# Patient Record
Sex: MALE | Race: WHITE | NOT HISPANIC OR LATINO
[De-identification: names, ages, dates, MRNs, and addresses within clinical notes are randomized per-mention and may not be internally consistent; named-entity substitution may affect disease eponyms.]

---

## 2020-01-15 ENCOUNTER — RECORD ABSTRACTING (OUTPATIENT)
Age: 70
End: 2020-01-15

## 2020-01-15 DIAGNOSIS — Z78.9 OTHER SPECIFIED HEALTH STATUS: ICD-10-CM

## 2020-01-15 DIAGNOSIS — I10 ESSENTIAL (PRIMARY) HYPERTENSION: ICD-10-CM

## 2020-01-15 LAB
PSA FREE FLD-MCNC: 20
PSA FREE SERPL-MCNC: 2.2
PSA SERPL-MCNC: 11
TESTOST BND SERPL-MCNC: 215.2

## 2020-01-29 ENCOUNTER — APPOINTMENT (OUTPATIENT)
Dept: UROLOGY | Facility: CLINIC | Age: 70
End: 2020-01-29
Payer: MEDICARE

## 2020-01-29 VITALS
WEIGHT: 218 LBS | HEART RATE: 71 BPM | HEIGHT: 77 IN | TEMPERATURE: 98.4 F | BODY MASS INDEX: 25.74 KG/M2 | DIASTOLIC BLOOD PRESSURE: 92 MMHG | SYSTOLIC BLOOD PRESSURE: 157 MMHG

## 2020-01-29 DIAGNOSIS — Z87.39 PERSONAL HISTORY OF OTHER DISEASES OF THE MUSCULOSKELETAL SYSTEM AND CONNECTIVE TISSUE: ICD-10-CM

## 2020-01-29 DIAGNOSIS — Z80.41 FAMILY HISTORY OF MALIGNANT NEOPLASM OF OVARY: ICD-10-CM

## 2020-01-29 DIAGNOSIS — Z80.51 FAMILY HISTORY OF MALIGNANT NEOPLASM OF KIDNEY: ICD-10-CM

## 2020-01-29 DIAGNOSIS — M72.2 PLANTAR FASCIAL FIBROMATOSIS: ICD-10-CM

## 2020-01-29 DIAGNOSIS — K21.9 GASTRO-ESOPHAGEAL REFLUX DISEASE W/OUT ESOPHAGITIS: ICD-10-CM

## 2020-01-29 PROCEDURE — 76857 US EXAM PELVIC LIMITED: CPT

## 2020-01-29 PROCEDURE — 99205 OFFICE O/P NEW HI 60 MIN: CPT

## 2020-01-29 RX ORDER — OLMESARTAN MEDOXOMIL 20 MG/1
20 TABLET, FILM COATED ORAL
Qty: 90 | Refills: 0 | Status: ACTIVE | COMMUNITY
Start: 2019-12-10

## 2020-01-29 NOTE — REVIEW OF SYSTEMS
[see HPI] : see HPI [Eyesight Problems] : eyesight problems [Negative] : Eyes [Heartburn] : heartburn

## 2020-01-29 NOTE — ASSESSMENT
[FreeTextEntry1] : I discussed the findings and options with . SYED HALL in detail.  His PSAs have been elevated but stable throughout the years.  In view of this, his reported normal prostate biopsy and MRI in the past, we will forego any additional intervention at this time. I have asked him to try and find the biopsy report and PSA corresponding to that period and send them to us.\par \par I would like Mr. Hall to get a baseline renal sono because of his prior history of urolithiasis and current TUMS intake. \par \par Providing the current PSA is stable, I look forward to seeing Mr. Hall in one year (sono, PSA).\par

## 2020-01-29 NOTE — PHYSICAL EXAM
[Normal Appearance] : normal appearance [General Appearance - Well Developed] : well developed [General Appearance - Well Nourished] : well nourished [General Appearance - In No Acute Distress] : no acute distress [Well Groomed] : well groomed [Edema] : no peripheral edema [Heart Rate And Rhythm] : Heart rate and rhythm were normal [Exaggerated Use Of Accessory Muscles For Inspiration] : no accessory muscle use [Respiration, Rhythm And Depth] : normal respiratory rhythm and effort [Abdomen Tenderness] : non-tender [Costovertebral Angle Tenderness] : no ~M costovertebral angle tenderness [Abdomen Soft] : soft [Penis Abnormality] : normal circumcised penis [Urinary Bladder Findings] : the bladder was normal on palpation [Urethral Meatus] : meatus normal [Scrotum] : the scrotum was normal [Testes Tenderness] : no tenderness of the testes [Testes Mass (___cm)] : there were no testicular masses [No Prostate Nodules] : no prostate nodules [Rectal Exam - Rectum] : digital rectal exam was normal [Prostate Tenderness] : the prostate was not tender [Normal Station and Gait] : the gait and station were normal for the patient's age [] : no rash [Oriented To Time, Place, And Person] : oriented to person, place, and time [No Focal Deficits] : no focal deficits [Affect] : the affect was normal [Not Anxious] : not anxious [Mood] : the mood was normal [No Palpable Adenopathy] : no palpable adenopathy [Abdomen Mass (___ Cm)] : no abdominal mass palpated [FreeTextEntry1] : MIldly indurated left caput epididymis.  Scrotal angiokeratomas

## 2020-01-29 NOTE — LETTER BODY
[Consult Letter:] : I had the pleasure of evaluating your patient, [unfilled]. [Dear  ___] : Dear  [unfilled], [Please see my note below.] : Please see my note below. [Consult Closing:] : Thank you very much for allowing me to participate in the care of this patient.  If you have any questions, please do not hesitate to contact me. [Sincerely,] : Sincerely, [FreeTextEntry3] : Jermaine Escalona MD, FACS\par

## 2020-01-29 NOTE — HISTORY OF PRESENT ILLNESS
[FreeTextEntry1] : Mr. SYED HALL comes in today for a urologic evaluation.  He presents with a long history of minimal to moderate LUTS (predominantly irritative) and nocturia x2.\par IPSS: 9/35\par Sono:  42cc PVR; 154cc prostate\par \par Mr. Hall has longstanding elevated PSAs (see below).  Approximately 10yrs ago he had a prostate biopsy and was admitted for a post-bx UTI/sepsis. The pathology report was reportedly negative. \par \par PSAs:  5/7/19--9.6; 1/10/19--11.0 (20%); 6/28/17--13/2 (23%); 12/15/16--(25%); 6/17/14--11.4 (19%); 3/13/13--7.2; 1/18/10--8.1 (17%)\par MRI prostate:  4/21/16--Severe BPH\par \par Mr. Hall also has a history of urolithiasis since his 20's, with three total episodes of colic--most recently approximately 13yrs ago.  He did not require any intervention for these. He has not had any recent followup for this.\par \par His erectile function is normal although he is not sexually active.\par

## 2020-01-30 LAB
PSA FREE FLD-MCNC: 23 %
PSA FREE SERPL-MCNC: 2.64 NG/ML
PSA SERPL-MCNC: 11.6 NG/ML

## 2021-02-02 NOTE — REVIEW OF SYSTEMS
[Eyesight Problems] : eyesight problems [Heartburn] : heartburn [see HPI] : see HPI [Negative] : Eyes

## 2021-02-04 ENCOUNTER — APPOINTMENT (OUTPATIENT)
Dept: UROLOGY | Facility: CLINIC | Age: 71
End: 2021-02-04
Payer: MEDICARE

## 2021-02-04 DIAGNOSIS — Z00.00 ENCOUNTER FOR GENERAL ADULT MEDICAL EXAMINATION W/OUT ABNORMAL FINDINGS: ICD-10-CM

## 2021-02-04 LAB
BILIRUB UR QL STRIP: NORMAL
CLARITY UR: CLEAR
COLLECTION METHOD: NORMAL
GLUCOSE UR-MCNC: NORMAL
HCG UR QL: 0.2 EU/DL
HGB UR QL STRIP.AUTO: NORMAL
KETONES UR-MCNC: NORMAL
LEUKOCYTE ESTERASE UR QL STRIP: NORMAL
NITRITE UR QL STRIP: NORMAL
PH UR STRIP: 5.5
PROT UR STRIP-MCNC: NORMAL
SP GR UR STRIP: NORMAL

## 2021-02-04 PROCEDURE — 76775 US EXAM ABDO BACK WALL LIM: CPT

## 2021-02-04 PROCEDURE — 99214 OFFICE O/P EST MOD 30 MIN: CPT | Mod: 25

## 2021-02-04 PROCEDURE — 76857 US EXAM PELVIC LIMITED: CPT | Mod: 59

## 2021-02-04 PROCEDURE — 81003 URINALYSIS AUTO W/O SCOPE: CPT | Mod: QW

## 2021-02-04 NOTE — ADDENDUM
[FreeTextEntry1] : A portion of this note was written by [Lenny Galvez] on 02/02/2021 acting as a scribe for Dr. Escalona. \par \par I have personally reviewed the chart and agree that the record accurately reflects my personal performance of the history, physical exam, assessment, and plan.

## 2021-02-04 NOTE — HISTORY OF PRESENT ILLNESS
[FreeTextEntry1] : Mr. SYED HALL comes in today for followup.  He presents with a longstanding history of  moderate lower urinary tract symptoms (predominantly irritative) and nocturia x 2. On occasion he has rare urge incontinence.  He drinks 4-5 cups of coffee daily.\par IPSS: 5/35\par Sono:  20cc PVR; 140cc prostate\par \par In addition, Mr. Hall has longstanding elevated PSAs (see below).  Approximately 11yrs ago he had a prostate biopsy and was admitted for a post-bx UTI/sepsis. The pathology report was reportedly negative (not provided). \par \par Mr. Hall also has a history of urolithiasis since his 20's, with three total episodes of colic--most recently approximately 14yrs ago.  He did not require any intervention for these. \par \par His erectile function is normal although he is not sexually active.\par \par PSAs:  1/29/20--11.6 (23%); 5/7/19--9.6; 1/10/19--11.0 (20%); 6/28/17--13.2 (23%); 12/15/16--11.1 (25%); 6/17/14--11.4 (19%); 3/13/13--7.2; 1/18/10--8.1 (17%)\par \par MRI prostate:  4/21/16--Severe BPH\par \par Renal sono: Bilateral renal cysts.  No stones.\par

## 2021-02-04 NOTE — LETTER BODY
[Dear  ___] : Dear  [unfilled], [Consult Letter:] : I had the pleasure of evaluating your patient, [unfilled]. [Please see my note below.] : Please see my note below. [Consult Closing:] : Thank you very much for allowing me to participate in the care of this patient.  If you have any questions, please do not hesitate to contact me. [Sincerely,] : Sincerely, [FreeTextEntry3] : Jermaine Escalona MD, FACS\par

## 2021-02-04 NOTE — ASSESSMENT
[FreeTextEntry1] : I discussed the findings and options with . SYED HALL in detail.  I advised that he incorporate behavioral modification to improve his voiding symptoms, avoiding pharmacologic management.  \par \par His PSAs have been elevated but stable throughout the years. I again asked Mr. Hall to try and find the prostate biopsy report and corresponding PSA.\par \par I will call Mr. Hall with the PSA result.  If it remains stable, he will simply followup in one year (bladder sono, PSA). \par

## 2021-02-05 ENCOUNTER — NON-APPOINTMENT (OUTPATIENT)
Age: 71
End: 2021-02-05

## 2021-02-05 LAB — PSA SERPL-MCNC: 17.4 NG/ML

## 2021-03-18 ENCOUNTER — APPOINTMENT (OUTPATIENT)
Dept: UROLOGY | Facility: CLINIC | Age: 71
End: 2021-03-18
Payer: MEDICARE

## 2021-03-18 VITALS — TEMPERATURE: 98 F

## 2021-03-18 LAB
BILIRUB UR QL STRIP: NORMAL
CLARITY UR: CLEAR
COLLECTION METHOD: NORMAL
GLUCOSE UR-MCNC: NORMAL
HCG UR QL: 1 EU/DL
HGB UR QL STRIP.AUTO: NORMAL
KETONES UR-MCNC: NORMAL
LEUKOCYTE ESTERASE UR QL STRIP: NORMAL
NITRITE UR QL STRIP: NORMAL
PH UR STRIP: 6
PROT UR STRIP-MCNC: NORMAL
SP GR UR STRIP: 1.03

## 2021-03-18 PROCEDURE — 99215 OFFICE O/P EST HI 40 MIN: CPT

## 2021-03-18 PROCEDURE — 81003 URINALYSIS AUTO W/O SCOPE: CPT | Mod: QW

## 2021-03-18 NOTE — HISTORY OF PRESENT ILLNESS
[FreeTextEntry1] : Mr. SYED HALL comes in today for followup after having a prostate MRI on February 12, 2021.  \par \par From his general urologic history, Mr. Hall presents with a longstanding history of  moderate lower urinary tract symptoms (predominantly irritative) and nocturia x 2. On occasion he has rare urge incontinence.  He has decreased his caffeine intake, which has resulted in improvement. \par IPSS: 4/35\par \par In addition, Mr. Hall has longstanding elevated PSAs (see below). In 2006 he had a prostate biopsy and was admitted for what he describes as a possible post-biopsy UTI (2 months post-procedure, making this unlikely directly related to the procedure).\par \par Mr. Hall also has a history of urolithiasis since his 20's, with three total episodes of colic--most recently approximately 14yrs ago.  He did not require any intervention for these. \par \par His erectile function is normal although he is not sexually active. He has not noted hematospermia.\par \par PSAs:  2/5/21--17.4; 2/3/21--13.1; 1/29/20--11.6 (23%); 5/7/19--9.6; 1/10/19--11.0 (20%); 6/28/17--13.2 (23%); 12/15/16--11.1 (25%); 6/17/14--11.4 (19%); 3/13/13--7.2; 1/18/10--8.1 (17%)\par \par Prostate bx: 4/7/06--BPH (~17 cores taken)\par \par MRI prostate: 2/12/21--Appearance of hemorrhage within the left seminal vesicle is consistent with a PI-RADS 3 lesion 141cc prostate*; 4/21/16--Severe BPH\par \par Renal sono: 2/4/21--Bilateral renal cysts.  No stones.\par \par * I spoke with Dr. Portillo at [facility] and reviewed the current prostate MRI and compared it to the prior reading (done at [facility], [place]).  He agreed that, based on the description, there has been no change.  The only finding was bleeding within the left seminal vesicle, which is what he classified as "PI-RADS 3".  Specifically no lesions were noted within the prostate.\par

## 2021-03-18 NOTE — ADDENDUM
[FreeTextEntry1] : A portion of this note was written by [Lenny aGlvez] on 02/02/2021 acting as a scribe for Dr. Escalona. \par \par I have personally reviewed the chart and agree that the record accurately reflects my personal performance of the history, physical exam, assessment, and plan.

## 2021-03-18 NOTE — ASSESSMENT
[FreeTextEntry1] : I discussed the findings and options with Mr. SYED FORD in great detail.  He understands that the data are somewhat conflicting. Specifically the PSAs increased in February 2021. In addition, the % free PSAs are borderline abnormal.  However, the PSA density is within the normal range and the prostate MRIs are unchanged (based on my discussion with Dr. Portillo).\par \par I will call Mr. Vogt with the current PSA.  If the elevation persists, a prostate biopsy will be advised; otherwise, he should repeat a PSA in 6 months. The two biopsy techniques (transrectal and transperineal) were described with their pros and cons. He understands that Gloria requires that the procedure be performed exclusively transperineally using a MR-fusion technique.  Biopsy related complications were outlined including bleeding (urinary/rectal/ejaculatory), infection, urosepsis (requiring prompt hospitalization), urinary retention.  \par

## 2021-03-18 NOTE — PHYSICAL EXAM
[General Appearance - Well Developed] : well developed [General Appearance - Well Nourished] : well nourished [Normal Appearance] : normal appearance [Well Groomed] : well groomed [General Appearance - In No Acute Distress] : no acute distress [] : no rash [Oriented To Time, Place, And Person] : oriented to person, place, and time [Affect] : the affect was normal [Mood] : the mood was normal [Not Anxious] : not anxious [Normal Station and Gait] : the gait and station were normal for the patient's age [FreeTextEntry1] : MIldly indurated left caput epididymis.  Scrotal angiokeratomas

## 2021-03-19 ENCOUNTER — NON-APPOINTMENT (OUTPATIENT)
Age: 71
End: 2021-03-19

## 2021-03-19 ENCOUNTER — APPOINTMENT (OUTPATIENT)
Dept: UROLOGY | Facility: CLINIC | Age: 71
End: 2021-03-19

## 2021-03-19 LAB
PSA FREE FLD-MCNC: 27 %
PSA FREE SERPL-MCNC: 4.58 NG/ML
PSA SERPL-MCNC: 17.1 NG/ML

## 2021-03-24 ENCOUNTER — APPOINTMENT (OUTPATIENT)
Dept: UROLOGY | Facility: CLINIC | Age: 71
End: 2021-03-24
Payer: MEDICARE

## 2021-03-24 PROCEDURE — 99215 OFFICE O/P EST HI 40 MIN: CPT | Mod: 95

## 2021-03-24 RX ORDER — ROSUVASTATIN CALCIUM 5 MG/1
TABLET, FILM COATED ORAL
Refills: 0 | Status: ACTIVE | COMMUNITY

## 2021-03-24 RX ORDER — INDAPAMIDE 1.25 MG/1
1.25 TABLET, FILM COATED ORAL
Refills: 0 | Status: ACTIVE | COMMUNITY

## 2021-03-24 RX ORDER — OLMESARTAN MEDOXOMIL 40 MG/1
TABLET, FILM COATED ORAL
Refills: 0 | Status: DISCONTINUED | COMMUNITY
End: 2021-03-24

## 2021-03-24 RX ORDER — ATORVASTATIN CALCIUM 10 MG/1
10 TABLET, FILM COATED ORAL
Qty: 90 | Refills: 0 | Status: DISCONTINUED | COMMUNITY
Start: 2019-11-06 | End: 2021-03-24

## 2021-03-24 RX ORDER — POTASSIUM CHLORIDE 1500 MG/1
20 TABLET, EXTENDED RELEASE ORAL
Refills: 0 | Status: ACTIVE | COMMUNITY

## 2021-03-24 RX ORDER — ASPIRIN 81 MG
81 TABLET, DELAYED RELEASE (ENTERIC COATED) ORAL
Refills: 0 | Status: ACTIVE | COMMUNITY

## 2021-03-24 NOTE — ASSESSMENT
[FreeTextEntry1] : Patient with history of elevated PSA, up to 17.1 now, with prior negative prostate biopsy in 2006, negative prostate MRI in 2016, and MRI 2/2021 with 140 cc prostate and PIRADS 3 due to presence of blood in seminal vesicle. Prostate biopsy is recommended.\par \par I spent this consultation discussing the implications of an elevated PSA including the fact that the PSA level may be affected by various factors including age, prostate size, ethnicity, use of medications, and concurrent prostatic inflammation. Serum PSA is generally proportional to the risk of prostate cancer but there is no specific PSA cut-off signifying prostate cancer. \par \par Prostate cancer screening: the patient and I spoke at length about prostate cancer screening, its risks and its benefits. The patient has 2 risk factors for prostate cancer.  He understands that many men with prostate cancer will die with the disease rather than of it and we also discussed the results large multi-center American and  prostate cancer screening trials. He also understands that PSA in and of itself does not diagnose prostate cancer but only assesses risk to a certain degree. The patient understands that to definitively screen for prostate cancer, a biopsy is required and this procedure has risks, including bleeding, infection, ED and urinary retention.  The patient opted to move forward with the biopsy, which is performed via a transperineal approach with MRI/US guided fusion targeting.\par \par The patient is aware to expect hematuria x 2 weeks and upto 4 weeks of hematospermia.  There is a risk of infection albeit much lower than a transrectal approach. In some cases patients can experience erectile dysfunction but this is usually self limiting.  Any fever/chills after the biopsy the patient is to contact the office and go to the ER for an immediate evaluation. He has been given paper instructions outlining these items - which includes medications to avoid prior to surgery.\par \par 1. CBC, BMP, PSA, Covid Test, UA UCx\par 2. PCP Clearance\par 3. TP biopsy at [facility]\par 4. follow up 2 weeks after biopsy with his primary urologist or ourselves.\par 5. we will call with the path results once they are resulted.\par \par Regarding enlarged prostate, patient has minimal urinary symptoms. We discussed that not all patients with an enlarged prostate will have urinary symptoms. However, the prostate may continue to get larger over time and may cause changes to bladder function over time. We discussed implications of enlarged prostate, indications for treatment and possible surgical treatments. Patient will continue to monitor.\par \par

## 2021-03-24 NOTE — REASON FOR VISIT
[Home] : at home, [unfilled] , at the time of the visit. [Medical Office: ([facility])___] : at the medical office located in  [Verbal consent obtained from patient] : the patient, [unfilled] [Follow-up Visit ___] : a follow-up visit  for [unfilled]

## 2021-03-24 NOTE — PHYSICAL EXAM
[General Appearance - Well Developed] : well developed [General Appearance - Well Nourished] : well nourished [Normal Appearance] : normal appearance [Well Groomed] : well groomed [General Appearance - In No Acute Distress] : no acute distress [] : no respiratory distress [Oriented To Time, Place, And Person] : oriented to person, place, and time [Affect] : the affect was normal [Not Anxious] : not anxious

## 2021-03-24 NOTE — HISTORY OF PRESENT ILLNESS
[FreeTextEntry1] : Per HPI from Dr. Escalona 3/18/21: From his general urologic history, Mr. Hall presents with a longstanding history of moderate lower urinary tract symptoms (predominantly irritative) and nocturia x 2. On occasion he has rare urge incontinence. He has decreased his caffeine intake, which has resulted in improvement. \par IPSS: 4/35\par \par In addition, Mr. Hall has longstanding elevated PSAs (see below). In 2006 he had a prostate biopsy and was admitted for what he describes as a possible post-biopsy UTI (2 months post-procedure, making this unlikely directly related to the procedure).\par \par Mr. Hall also has a history of urolithiasis since his 20's, with three total episodes of colic--most recently approximately 14yrs ago. He did not require any intervention for these. \par \par His erectile function is normal although he is not sexually active. He has not noted hematospermia.\par \par PSAs: 2/5/21--17.4; 2/3/21--13.1; 1/29/20--11.6 (23%); 5/7/19--9.6; 1/10/19--11.0 (20%); 6/28/17--13.2 (23%); 12/15/16--11.1 (25%); 6/17/14--11.4 (19%); 3/13/13--7.2; 1/18/10--8.1 (17%)\par \par Prostate bx: 4/7/06--BPH (~17 cores taken)\par \par MRI prostate: 2/12/21--Appearance of hemorrhage within the left seminal vesicle is consistent with a PI-RADS 3 lesion 141cc prostate*; 4/21/16--Severe BPH\par \par Renal sono: 2/4/21--Bilateral renal cysts. No stones.\par \par * I spoke with Dr. Portillo at [facility] and reviewed the current prostate MRI and compared it to the prior reading (done at [facility]). He agreed that, based on the description, there has been no change. The only finding was bleeding within the left seminal vesicle, which is what he classified as "PI-RADS 3". Specifically no lesions were noted within the prostate.\par \par Follow up 3/24/21: patient presents today to discuss prostate biopsy. No changes to health in the interim.

## 2021-03-25 ENCOUNTER — APPOINTMENT (OUTPATIENT)
Dept: UROLOGY | Facility: CLINIC | Age: 71
End: 2021-03-25

## 2021-04-02 ENCOUNTER — LABORATORY RESULT (OUTPATIENT)
Age: 71
End: 2021-04-02

## 2021-04-04 ENCOUNTER — TRANSCRIPTION ENCOUNTER (OUTPATIENT)
Age: 71
End: 2021-04-04

## 2021-04-05 ENCOUNTER — APPOINTMENT (OUTPATIENT)
Dept: UROLOGY | Facility: AMBULATORY SURGERY CENTER | Age: 71
End: 2021-04-05

## 2021-04-05 ENCOUNTER — RESULT REVIEW (OUTPATIENT)
Age: 71
End: 2021-04-05

## 2021-04-05 ENCOUNTER — OUTPATIENT (OUTPATIENT)
Dept: OUTPATIENT SERVICES | Facility: HOSPITAL | Age: 71
LOS: 1 days | Discharge: HOME | End: 2021-04-05
Payer: MEDICARE

## 2021-04-05 PROCEDURE — 55706 BX PRST8 NDL SAT SAMPLING: CPT

## 2021-04-05 PROCEDURE — 76872 US TRANSRECTAL: CPT | Mod: 26

## 2021-04-05 PROCEDURE — 88305 TISSUE EXAM BY PATHOLOGIST: CPT | Mod: 26

## 2021-04-05 NOTE — BRIEF OPERATIVE NOTE - NSICDXBRIEFPROCEDURE_GEN_ALL_CORE_FT
PROCEDURES:  Biopsy of prostate using magnetic resonance imaging-ultrasound fusion guidance 05-Apr-2021 10:30:19  Micah Parham

## 2021-04-07 LAB — SURGICAL PATHOLOGY STUDY: SIGNIFICANT CHANGE UP

## 2021-04-08 ENCOUNTER — NON-APPOINTMENT (OUTPATIENT)
Age: 71
End: 2021-04-08

## 2021-04-13 ENCOUNTER — APPOINTMENT (OUTPATIENT)
Dept: UROLOGY | Facility: AMBULATORY SURGERY CENTER | Age: 71
End: 2021-04-13
Payer: MEDICARE

## 2021-04-13 PROCEDURE — 99215 OFFICE O/P EST HI 40 MIN: CPT | Mod: 95

## 2021-04-13 NOTE — HISTORY OF PRESENT ILLNESS
[FreeTextEntry1] : I contacted Mr. SYED HALL by telemedicine using the [employer] platform. The patient was located at his home address.  The appropriate consent was obtained prior to the conference.  The primary purpose of the meeting was to discuss his recently diagnosed prostate cancer. \par \par Mr. SYED HALL comes in today for followup after being diagnosed with Grade Group 1 prostatic adenocarcinoma with Dr. Jhonatan Hanna on April 1, 2021. \par \par From his general urologic history, Mr. Hall presents with a longstanding history of minimal-moderate lower urinary tract symptoms (predominantly irritative) and nocturia x 2. On occasion he has rare urge incontinence.  He has decreased his caffeine intake, which has resulted in improvement. \par IPSS: 4/35\par \par In addition, Mr. Hall has longstanding elevated PSAs (see below). In 2006 he had a prostate biopsy and was admitted for what he describes as a possible post-biopsy UTI (2 months post-procedure, making this unlikely directly related to the procedure).\par \par Mr. Hall also has a history of urolithiasis since his 20's, with three total episodes of colic--most recently approximately 14yrs ago.  He did not require any intervention for these. \par \par His erectile function is normal although he is not sexually active. He has not noted hematospermia.\par \par PSAs:  2/5/21--17.4; 2/3/21--13.1; 1/29/20--11.6 (23%); 5/7/19--9.6; 1/10/19--11.0 (20%); 6/28/17--13.2 (23%); 12/15/16--11.1 (25%); 6/17/14--11.4 (19%); 3/13/13--7.2; 1/18/10--8.1 (17%)\par \par Prostate bx: 4/8/21--Grade Group 1 (Kayla 3+3=6) prostatic adenocarcinoma in the right posterior apex, involving 25% of single core biopsy; 2/27/09--BPH (13 cores); 4/7/06--BPH (~17 cores taken)\par \par MRI prostate: 2/12/21--Appearance of hemorrhage within the left seminal vesicle is consistent with a PI-RADS 3 lesion 141cc prostate*; 4/21/16--Severe BPH\par \par Renal sono: 2/4/21--Bilateral renal cysts.  No stones.\par \par * I spoke with Dr. Portillo at [facility] and reviewed the current prostate MRI and compared it to the prior reading (done at [facility]).  He agreed that, based on the description, there has been no change.  The only finding was bleeding within the left seminal vesicle, which is what he classified as "PI-RADS 3".  Specifically no lesions were noted within the prostate.\par

## 2021-04-13 NOTE — ASSESSMENT
[FreeTextEntry1] : I had a comprehensive discussion with Mr. Joe Hall outlining the findings and options for the newly diagnosed low risk prostate cancer, including active surveillance, surgery, radiation therapy (traditional, stereotactic, brachytherapy), cryosurgery, and partial gland/focal ablation.  I provided him with the risks and benefits of each approach. Going forward, I recommended that he consider other opinions including a consultation with a radiation oncologist, as needed.\par \par I recommended active surveillance in this setting and informed him that this would require a PSA and rectal examination every 3 months for the first year.  At 1 year, a repeat MRI and prostate biopsy should be considered.\par 
0 = swallows foods/liquids without difficulty

## 2021-04-13 NOTE — ADDENDUM
[FreeTextEntry1] : A portion of this note was written by [Lenny Galvez] on 04/13/2021 acting as a scribe for Dr. Escalona. \par \par I have personally reviewed the chart and agree that the record accurately reflects my personal performance of the history, physical exam, assessment, and plan.

## 2021-04-21 NOTE — PHYSICAL EXAM
[FreeTextEntry1] : HTN\par BP systolic 140\par Low salt, exercise and weight loss.\par \par HLD, diet has improved\par Had a discussion with patient about Preble risk of CVD.  She is open to taking medications at this time.  If her blood work today shows high cholesterol.  She states that she has made good lifestyle choices and a cholesterol shows high levels prior to this.\par She would like to consider discontinuing the medication later in the year if her stresses reduce and she maintains a good lifestyle.\par Elevated A1C diet is okay, last labs okay\par \par Obesity: see nutritionist when she has reduced stresses with Dad\par  [General Appearance - Well Developed] : well developed [General Appearance - Well Nourished] : well nourished [Normal Appearance] : normal appearance [Well Groomed] : well groomed [General Appearance - In No Acute Distress] : no acute distress [Abdomen Soft] : soft [Abdomen Tenderness] : non-tender [Abdomen Mass (___ Cm)] : no abdominal mass palpated [Costovertebral Angle Tenderness] : no ~M costovertebral angle tenderness [Urethral Meatus] : meatus normal [Urinary Bladder Findings] : the bladder was normal on palpation [Penis Abnormality] : normal circumcised penis [Scrotum] : the scrotum was normal [Testes Tenderness] : no tenderness of the testes [Testes Mass (___cm)] : there were no testicular masses [Rectal Exam - Rectum] : digital rectal exam was normal [Prostate Tenderness] : the prostate was not tender [No Prostate Nodules] : no prostate nodules [Heart Rate And Rhythm] : Heart rate and rhythm were normal [Edema] : no peripheral edema [] : no respiratory distress [Respiration, Rhythm And Depth] : normal respiratory rhythm and effort [Exaggerated Use Of Accessory Muscles For Inspiration] : no accessory muscle use [Oriented To Time, Place, And Person] : oriented to person, place, and time [Affect] : the affect was normal [Mood] : the mood was normal [Not Anxious] : not anxious [Normal Station and Gait] : the gait and station were normal for the patient's age [No Focal Deficits] : no focal deficits [No Palpable Adenopathy] : no palpable adenopathy [Epididymis] : the epididymides were normal [Skin Color & Pigmentation] : normal skin color and pigmentation [FreeTextEntry1] : MIldly indurated left caput epididymis.  Scrotal angiokeratomas

## 2021-07-01 ENCOUNTER — APPOINTMENT (OUTPATIENT)
Dept: UROLOGY | Facility: CLINIC | Age: 71
End: 2021-07-01
Payer: MEDICARE

## 2021-07-01 VITALS — HEART RATE: 85 BPM | DIASTOLIC BLOOD PRESSURE: 75 MMHG | TEMPERATURE: 97.8 F | SYSTOLIC BLOOD PRESSURE: 122 MMHG

## 2021-07-01 LAB
BILIRUB UR QL STRIP: NORMAL
CLARITY UR: CLEAR
COLLECTION METHOD: NORMAL
GLUCOSE UR-MCNC: NORMAL
HCG UR QL: 0.2 EU/DL
HGB UR QL STRIP.AUTO: NORMAL
KETONES UR-MCNC: NORMAL
LEUKOCYTE ESTERASE UR QL STRIP: NORMAL
NITRITE UR QL STRIP: NORMAL
PH UR STRIP: 5.5
PROT UR STRIP-MCNC: NORMAL
SP GR UR STRIP: 1.03

## 2021-07-01 PROCEDURE — 76857 US EXAM PELVIC LIMITED: CPT

## 2021-07-01 PROCEDURE — 99214 OFFICE O/P EST MOD 30 MIN: CPT

## 2021-07-01 RX ORDER — OMEPRAZOLE MAGNESIUM 40 MG/1
CAPSULE, DELAYED RELEASE ORAL
Refills: 0 | Status: ACTIVE | COMMUNITY

## 2021-07-01 RX ORDER — CALCIUM CARBONATE 500 MG/1
TABLET, CHEWABLE ORAL
Refills: 0 | Status: DISCONTINUED | COMMUNITY
End: 2021-07-01

## 2021-07-01 NOTE — PHYSICAL EXAM
[General Appearance - Well Developed] : well developed [General Appearance - Well Nourished] : well nourished [Normal Appearance] : normal appearance [Well Groomed] : well groomed [General Appearance - In No Acute Distress] : no acute distress [Abdomen Soft] : soft [Abdomen Tenderness] : non-tender [Costovertebral Angle Tenderness] : no ~M costovertebral angle tenderness [Urethral Meatus] : meatus normal [Urinary Bladder Findings] : the bladder was normal on palpation [Scrotum] : the scrotum was normal [No Prostate Nodules] : no prostate nodules [Testes Mass (___cm)] : there were no testicular masses [Edema] : no peripheral edema [] : no respiratory distress [Respiration, Rhythm And Depth] : normal respiratory rhythm and effort [Exaggerated Use Of Accessory Muscles For Inspiration] : no accessory muscle use [Oriented To Time, Place, And Person] : oriented to person, place, and time [Affect] : the affect was normal [Mood] : the mood was normal [Not Anxious] : not anxious [Normal Station and Gait] : the gait and station were normal for the patient's age [No Focal Deficits] : no focal deficits [No Palpable Adenopathy] : no palpable adenopathy [Abdomen Mass (___ Cm)] : no abdominal mass palpated [Penis Abnormality] : normal circumcised penis [Testes Tenderness] : no tenderness of the testes [Epididymis] : the epididymides were normal [Prostate Tenderness] : the prostate was not tender [Skin Color & Pigmentation] : normal skin color and pigmentation [FreeTextEntry1] : Diffuse angiomas

## 2021-07-01 NOTE — ASSESSMENT
[FreeTextEntry1] : I discussed the findings and options with . SYED HALL in detail. He will continue with active surveillance (AS) for the low risk prostate cancer.  We will call him with the PSA result.\par \par Mr. Hall understands that he should have another prostate MRI and biopsy in one year as part of the AS protocol. \par \par

## 2021-07-01 NOTE — ADDENDUM
[FreeTextEntry1] : A portion of this note was written by [Lenny Galvez] on 06/30/2021 acting as a scribe for Dr. Escalona. \par \par I have personally reviewed the chart and agree that the record accurately reflects my personal performance of the history, physical exam, assessment, and plan.

## 2021-07-01 NOTE — HISTORY OF PRESENT ILLNESS
[FreeTextEntry1] : Mr. SYED HALL comes in today for followup after being diagnosed with a Grade Group 1 prostatic adenocarcinoma in April 2021. He is currently pursuing active surveillance. \par Mr. Hall has longstanding elevated PSAs (see below). In 2006 he had a prostate biopsy and was admitted for what he describes as a possible post-biopsy UTI (2 months post-procedure, making this unlikely directly related to the procedure).\par \par From his general urologic history, Mr. Hall presents with a longstanding history of minimal-moderate lower urinary tract symptoms (predominantly irritative) and nocturia x 1-2. On occasion he has rare urge incontinence.  He has decreased his caffeine intake, which has resulted in improvement. \par IPSS: 7/35\par Sono: 24cc PVR; 99cc prostate\par \par Mr. Hall also has a history of urolithiasis since his 20's, with three total episodes of colic--most recently approximately 14yrs ago.  He did not require any intervention for these. \par \par His erectile function is normal although he is not sexually active. He has not noted hematospermia.\par \par PSAs:  2/5/21--17.4; 2/3/21--13.1; 1/29/20--11.6 (23%); 5/7/19--9.6; 1/10/19--11.0 (20%); 6/28/17--13.2 (23%); 12/15/16--11.1 (25%); 6/17/14--11.4 (19%); 3/13/13--7.2; 1/18/10--8.1 (17%)\par \par Prostate bx: 4/8/21--Grade Group 1 ([place] 3+3=6) prostatic adenocarcinoma in the right posterior apex, involving 25% of single core biopsy; 2/27/09--BPH (13 cores); 4/7/06--BPH (~17 cores taken)\par \par MRI prostate: 2/12/21--Appearance of hemorrhage within the left seminal vesicle is consistent with a PI-RADS 3 lesion (no change from prior study). 141cc prostate; 4/21/16--Severe BPH\par \par Renal sono: 2/4/21--Bilateral renal cysts.  No stones.\par \par \par

## 2021-07-02 ENCOUNTER — NON-APPOINTMENT (OUTPATIENT)
Age: 71
End: 2021-07-02

## 2021-07-02 LAB — PSA SERPL-MCNC: 13 NG/ML

## 2021-10-14 ENCOUNTER — APPOINTMENT (OUTPATIENT)
Dept: UROLOGY | Facility: CLINIC | Age: 71
End: 2021-10-14
Payer: MEDICARE

## 2021-10-14 VITALS
DIASTOLIC BLOOD PRESSURE: 76 MMHG | BODY MASS INDEX: 24.79 KG/M2 | OXYGEN SATURATION: 96 % | TEMPERATURE: 98.1 F | HEIGHT: 77 IN | SYSTOLIC BLOOD PRESSURE: 115 MMHG | WEIGHT: 210 LBS | HEART RATE: 71 BPM

## 2021-10-14 PROCEDURE — 76857 US EXAM PELVIC LIMITED: CPT

## 2021-10-14 PROCEDURE — 99214 OFFICE O/P EST MOD 30 MIN: CPT

## 2021-10-14 NOTE — PHYSICAL EXAM
[General Appearance - Well Developed] : well developed [General Appearance - Well Nourished] : well nourished [Normal Appearance] : normal appearance [Well Groomed] : well groomed [General Appearance - In No Acute Distress] : no acute distress [Abdomen Soft] : soft [Abdomen Tenderness] : non-tender [Abdomen Mass (___ Cm)] : no abdominal mass palpated [Costovertebral Angle Tenderness] : no ~M costovertebral angle tenderness [Penis Abnormality] : normal circumcised penis [Urethral Meatus] : meatus normal [Urinary Bladder Findings] : the bladder was normal on palpation [Scrotum] : the scrotum was normal [Epididymis] : the epididymides were normal [Testes Tenderness] : no tenderness of the testes [Testes Mass (___cm)] : there were no testicular masses [Prostate Tenderness] : the prostate was not tender [No Prostate Nodules] : no prostate nodules [Skin Color & Pigmentation] : normal skin color and pigmentation [Edema] : no peripheral edema [] : no respiratory distress [Respiration, Rhythm And Depth] : normal respiratory rhythm and effort [Exaggerated Use Of Accessory Muscles For Inspiration] : no accessory muscle use [Oriented To Time, Place, And Person] : oriented to person, place, and time [Affect] : the affect was normal [Mood] : the mood was normal [Not Anxious] : not anxious [Normal Station and Gait] : the gait and station were normal for the patient's age [No Focal Deficits] : no focal deficits [No Palpable Adenopathy] : no palpable adenopathy [FreeTextEntry1] : Diffuse angiomas

## 2021-10-14 NOTE — ADDENDUM
[FreeTextEntry1] : A portion of this note was written by [Lenny Galvez] on 10/13/2021 acting as a scribe for Dr. Escalona. \par \par I have personally reviewed the chart and agree that the record accurately reflects my personal performance of the history, physical exam, assessment, and plan.

## 2021-10-14 NOTE — HISTORY OF PRESENT ILLNESS
[FreeTextEntry1] : Mr. SYED HALL comes in today for followup after being diagnosed with a Grade Group 1 prostatic adenocarcinoma in April 2021. He is currently pursuing active surveillance. \par \par Mr. Hall has longstanding elevated PSAs (see below). In 2006 he had a prostate biopsy and was admitted for what he describes as a possible post-biopsy UTI (2 months post-procedure, making this unlikely directly related to the procedure).\par \par From his general urologic history, Mr. Hall presents with a longstanding history of minimal-moderate lower urinary tract symptoms (predominantly irritative) and nocturia x 1-2. On occasion he has rare urge incontinence.  He has decreased his caffeine intake, which has resulted in improvement. \par IPSS: 10/35\par Sono: 51cc PVR; 89cc prostate\par \par Mr. Hall also has a history of urolithiasis since his 20's, with three total episodes of colic--most recently approximately 14yrs ago.  He did not require any intervention for these. \par \par His erectile function is normal although he is not sexually active. He has not noted hematospermia.\par \par PSAs:  7/2/21--13.0; 2/5/21--17.4; 2/3/21--13.1; 1/29/20--11.6 (23%); 5/7/19--9.6; 1/10/19--11.0 (20%); 6/28/17--13.2 (23%); 12/15/16--11.1 (25%); 6/17/14--11.4 (19%); 3/13/13--7.2; 1/18/10--8.1 (17%)\par \par Prostate bx: 4/8/21--Grade Group 1 ([place] 3+3=6) prostatic adenocarcinoma in the right posterior apex, involving 25% of single core biopsy; 2/27/09--BPH (13 cores); 4/7/06--BPH (~17 cores taken)\par \par MRI prostate: 2/12/21--Appearance of hemorrhage within the left seminal vesicle is consistent with a PI-RADS 3 lesion (no change from prior study). 141cc prostate; 4/21/16--Severe BPH\par \par Renal sono: 2/4/21--Bilateral renal cysts.  No stones.\par \par \par

## 2021-10-14 NOTE — ASSESSMENT
[FreeTextEntry1] : I discussed the findings and options with Mr. SYDE HALL in detail. He will continue with active surveillance (AS) for the low risk prostate cancer.  Providing the PSA is stable, he will return in 3-4 months (bladder sono, PSA).  \par \par Mr. Hall understands that he should have another prostate MRI and biopsy in April 2022 as part of the AS protocol. \par \par (He will be relocating to [place] for, at least, 1 year).\par \par

## 2021-10-15 ENCOUNTER — NON-APPOINTMENT (OUTPATIENT)
Age: 71
End: 2021-10-15

## 2021-10-15 LAB — PSA SERPL-MCNC: 17.4 NG/ML

## 2021-10-25 ENCOUNTER — NON-APPOINTMENT (OUTPATIENT)
Age: 71
End: 2021-10-25

## 2022-01-31 ENCOUNTER — APPOINTMENT (OUTPATIENT)
Dept: UROLOGY | Facility: CLINIC | Age: 72
End: 2022-01-31

## 2022-02-08 ENCOUNTER — APPOINTMENT (OUTPATIENT)
Dept: UROLOGY | Facility: CLINIC | Age: 72
End: 2022-02-08
Payer: MEDICARE

## 2022-02-08 VITALS
WEIGHT: 205 LBS | BODY MASS INDEX: 24.21 KG/M2 | DIASTOLIC BLOOD PRESSURE: 78 MMHG | SYSTOLIC BLOOD PRESSURE: 124 MMHG | HEART RATE: 108 BPM | HEIGHT: 77 IN | TEMPERATURE: 97.8 F | RESPIRATION RATE: 16 BRPM

## 2022-02-08 DIAGNOSIS — N39.41 URGE INCONTINENCE: ICD-10-CM

## 2022-02-08 LAB
BILIRUB UR QL STRIP: NORMAL
CLARITY UR: CLEAR
COLLECTION METHOD: NORMAL
GLUCOSE UR-MCNC: NORMAL
HCG UR QL: 0.2 EU/DL
HGB UR QL STRIP.AUTO: NORMAL
KETONES UR-MCNC: NORMAL
LEUKOCYTE ESTERASE UR QL STRIP: NORMAL
NITRITE UR QL STRIP: NORMAL
PH UR STRIP: 5.5
PROT UR STRIP-MCNC: NORMAL
SP GR UR STRIP: >1.03

## 2022-02-08 PROCEDURE — 76857 US EXAM PELVIC LIMITED: CPT

## 2022-02-08 PROCEDURE — 99215 OFFICE O/P EST HI 40 MIN: CPT

## 2022-02-08 PROCEDURE — 81003 URINALYSIS AUTO W/O SCOPE: CPT | Mod: QW

## 2022-02-08 NOTE — HISTORY OF PRESENT ILLNESS
[FreeTextEntry1] : Mr. SYED HALL comes in today for followup after being diagnosed with a Grade Group 1 prostatic adenocarcinoma in April 2021. He is currently pursuing active surveillance. \par \par Mr. Hall has longstanding elevated PSAs (see below). In 2006 he had a prostate biopsy and was admitted for what he describes as a possible post-biopsy UTI (2 months post-procedure, making this unlikely directly related to the procedure).\par \par From his general urologic history, Mr. Hall presents with a longstanding history of minimal-moderate lower urinary tract symptoms (predominantly irritative) and nocturia x 1-2. On occasion he has rare urge incontinence.  He has decreased his caffeine intake, which has resulted in improvement. \par IPSS: 7/35\par Sono: 3cc PVR; 120cc prostate\par \par Mr. Hall also has a history of urolithiasis since his 20's, with three total episodes of colic--most recently approximately 14yrs ago.  He did not require any intervention for these. \par \par His erectile function is normal although he is not sexually active. He has not noted hematospermia.\par \par PSAs:  10/15/21--17.4; 7/2/21--13.0; 2/5/21--17.4; 2/3/21--13.1; 1/29/20--11.6 (23%); 5/7/19--9.6; 1/10/19--11.0 (20%); 6/28/17--13.2 (23%); 12/15/16--11.1 (25%); 6/17/14--11.4 (19%); 3/13/13--7.2; 1/18/10--8.1 (17%)\par \par Prostate bx: 4/8/21--Grade Group 1 ([place] 3+3=6) prostatic adenocarcinoma in the right posterior apex, involving 25% of single core biopsy; 2/27/09--BPH (13 cores); 4/7/06--BPH (~17 cores taken)\par \par MRI prostate: 2/12/21--Appearance of hemorrhage within the left seminal vesicle is consistent with a PI-RADS 3 lesion (no change from prior study). 141cc prostate; 4/21/16--Severe BPH\par \par Renal sono: 2/4/21--Bilateral renal cysts.  No stones.\par \par \par

## 2022-02-08 NOTE — ADDENDUM
[FreeTextEntry1] : A portion of this note was written by [Lenny Galvez] on 02/07/2022 acting as a scribe for Dr. Escalona. \par \par I have personally reviewed the chart and agree that the record accurately reflects my personal performance of the history, physical exam, assessment, and plan.

## 2022-02-08 NOTE — ASSESSMENT
[FreeTextEntry1] : I discussed the findings and options with Mr. SYED HALL in detail. He will continue with active surveillance (AS) for the low risk prostate cancer. \par \par Mr. Hall refuses a followup prostate MRI as he has claustrophobia and had a terrible experience during the last MRI. \par \par He will call to arrange the one year followup prostate biopsy with Dr. Hanna for when he returns to [place] (as he know lives in [place]). \par \par We will call him with the PSA. \par \par Regarding the microhematuria, I have asked Mr. Hall to do urinalysis in 2 to 4 weeks in [place].  I will call him when I see that result.  If the microhematuria persist, a more extensive urologic evaluation will be advised.\par \par \par \par \par

## 2022-02-08 NOTE — PHYSICAL EXAM
[General Appearance - Well Developed] : well developed [General Appearance - Well Nourished] : well nourished [Normal Appearance] : normal appearance [Well Groomed] : well groomed [General Appearance - In No Acute Distress] : no acute distress [Abdomen Soft] : soft [Abdomen Tenderness] : non-tender [Abdomen Mass (___ Cm)] : no abdominal mass palpated [Costovertebral Angle Tenderness] : no ~M costovertebral angle tenderness [Urethral Meatus] : meatus normal [Penis Abnormality] : normal circumcised penis [Urinary Bladder Findings] : the bladder was normal on palpation [Scrotum] : the scrotum was normal [Epididymis] : the epididymides were normal [Testes Tenderness] : no tenderness of the testes [Testes Mass (___cm)] : there were no testicular masses [Prostate Tenderness] : the prostate was not tender [No Prostate Nodules] : no prostate nodules [Skin Color & Pigmentation] : normal skin color and pigmentation [Edema] : no peripheral edema [] : no respiratory distress [Respiration, Rhythm And Depth] : normal respiratory rhythm and effort [Exaggerated Use Of Accessory Muscles For Inspiration] : no accessory muscle use [Oriented To Time, Place, And Person] : oriented to person, place, and time [Affect] : the affect was normal [Mood] : the mood was normal [Not Anxious] : not anxious [Normal Station and Gait] : the gait and station were normal for the patient's age [No Focal Deficits] : no focal deficits [No Palpable Adenopathy] : no palpable adenopathy [FreeTextEntry1] : Diffuse angiomas

## 2022-02-09 LAB
APPEARANCE: CLEAR
BACTERIA: NEGATIVE
BILIRUBIN URINE: NEGATIVE
BLOOD URINE: NORMAL
COLOR: YELLOW
GLUCOSE QUALITATIVE U: NEGATIVE
HYALINE CASTS: 1 /LPF
KETONES URINE: NORMAL
LEUKOCYTE ESTERASE URINE: NEGATIVE
MICROSCOPIC-UA: NORMAL
NITRITE URINE: NEGATIVE
PH URINE: 5
PROTEIN URINE: NEGATIVE
PSA SERPL-MCNC: 18.5 NG/ML
RED BLOOD CELLS URINE: 6 /HPF
SPECIFIC GRAVITY URINE: >=1.03
SQUAMOUS EPITHELIAL CELLS: 1 /HPF
UROBILINOGEN URINE: NORMAL
WHITE BLOOD CELLS URINE: 5 /HPF

## 2022-02-10 LAB
BACTERIA UR CULT: NORMAL
URINE CYTOLOGY: NORMAL

## 2022-02-14 ENCOUNTER — NON-APPOINTMENT (OUTPATIENT)
Age: 72
End: 2022-02-14

## 2022-04-07 ENCOUNTER — NON-APPOINTMENT (OUTPATIENT)
Age: 72
End: 2022-04-07

## 2022-06-24 ENCOUNTER — APPOINTMENT (OUTPATIENT)
Dept: UROLOGY | Facility: CLINIC | Age: 72
End: 2022-06-24
Payer: MEDICARE

## 2022-06-24 VITALS
OXYGEN SATURATION: 96 % | WEIGHT: 205 LBS | SYSTOLIC BLOOD PRESSURE: 129 MMHG | DIASTOLIC BLOOD PRESSURE: 72 MMHG | HEIGHT: 77 IN | TEMPERATURE: 98.1 F | BODY MASS INDEX: 24.21 KG/M2 | HEART RATE: 72 BPM | RESPIRATION RATE: 16 BRPM

## 2022-06-24 PROCEDURE — 99213 OFFICE O/P EST LOW 20 MIN: CPT | Mod: 95

## 2022-06-24 NOTE — HISTORY OF PRESENT ILLNESS
[Home] : at home, [unfilled] , at the time of the visit. [Medical Office: ([facility])___] : at the medical office located in  [Verbal consent obtained from patient] : the patient, [unfilled] [FreeTextEntry1] : Mr. SYED HALL comes in today for followup after being diagnosed with a Grade Group 1 prostatic adenocarcinoma in April 2021. He is currently pursuing active surveillance.  This was a telehealth visit. Video platform was not working, so visit was conducted by phone.\par \par From his general urologic history, Mr. Hall presents with a longstanding history of minimal-moderate lower urinary tract symptoms (predominantly irritative) and nocturia x 1-2. On occasion he has rare urge incontinence. He has decreased his caffeine intake, which has resulted in improvement. \par IPSS: 7/35\par Sono: 3cc PVR; 120cc prostate\par \par PSAs: 2/8/22--18.5; 10/15/21--17.4; 7/2/21--13.0; 2/5/21--17.4; 2/3/21--13.1; 1/29/20--11.6 (23%); 5/7/19--9.6; 1/10/19--11.0 (20%); 6/28/17--13.2 (23%); 12/15/16--11.1 (25%); 6/17/14--11.4 (19%); 3/13/13--7.2; 1/18/10--8.1 (17%)\par \par Prostate bx: 4/8/21--Grade Group 1 ([place] 3+3=6) prostatic adenocarcinoma in the right posterior apex, involving 25% of single core biopsy; 2/27/09--BPH (13 cores); 4/7/06--BPH (~17 cores taken)\par \par MRI prostate: 10/22/21--155 cc prostate, no lesions; 2/12/21--Appearance of hemorrhage within the left seminal vesicle is consistent with a PI-RADS 3 lesion (no change from prior study). 141cc prostate; 4/21/16--Severe BPH\par \par Renal sono: 2/4/21--Bilateral renal cysts. No stones.\par \par

## 2022-06-24 NOTE — ASSESSMENT
[FreeTextEntry1] : 71 year old man with [place] 3+3 prostate cancer diagnosed 4/2021, MRI in 10/2021 with no concerning lesions , 155 cc prostate. \par \par Patient is due for confirmatory prostate biopsy as part of active surveillance protocol. Although MRI was performed several months ago, I do not see need to repeat MRI as he has had multiple negative MRIs. Will proceed with prostate biopsy.\par \par The patient is aware to expect hematuria x 2 weeks and up to 4 weeks of hematospermia.  There is a risk of infection albeit much lower than a transrectal approach. In some cases patients can experience erectile dysfunction but this is usually self limiting.  Any fever/chills after the biopsy the patient is to contact the office and go to the ER for an immediate evaluation. He has been given paper instructions outlining these items - which includes medications to avoid prior to surgery.\par \par 1. CBC, BMP, PSA, Covid Test, UA UCx\par 2. PCP Clearance\par 3. TP biopsy at [facility]\par 4. follow up 2 weeks after biopsy with his primary urologist or ourselves.\par 5. we will call with the path results once they are resulted.\par

## 2022-07-29 NOTE — ASU PATIENT PROFILE, ADULT - FALL HARM RISK - UNIVERSAL INTERVENTIONS
Bed in lowest position, wheels locked, appropriate side rails in place/Call bell, personal items and telephone in reach/Instruct patient to call for assistance before getting out of bed or chair/Non-slip footwear when patient is out of bed/[place] to call system/Physically safe environment - no spills, clutter or unnecessary equipment/Purposeful Proactive Rounding/Room/bathroom lighting operational, light cord in reach

## 2022-07-29 NOTE — ASU PATIENT PROFILE, ADULT - NSICDXPASTSURGICALHX_GEN_ALL_CORE_FT
PAST SURGICAL HISTORY:  H/O basal cell carcinoma excision cheek    H/O cataract extraction     H/O hernia repair     H/O prostate biopsy     History of lipoma Neck

## 2022-07-29 NOTE — ASU PATIENT PROFILE, ADULT - NSICDXPASTMEDICALHX_GEN_ALL_CORE_FT
PAST MEDICAL HISTORY:  Acid reflux     History of elevated PSA OU    HTN (hypertension)     Hyperlipidemia     Kidney stone     Urination frequency

## 2022-07-29 NOTE — ASU PATIENT PROFILE, ADULT - NS PREOP UNDERSTANDS INFO
leave valuables/jewelry/contacts at home, make sure to have escort 18+, bring photo ID, insurance card + covid vax card, no solid foods after midnight, water/apple juice/gatorade until 0700/yes

## 2022-07-31 ENCOUNTER — TRANSCRIPTION ENCOUNTER (OUTPATIENT)
Age: 72
End: 2022-07-31

## 2022-07-31 NOTE — ASU DISCHARGE PLAN (ADULT/PEDIATRIC) - CARE PROVIDER_API CALL
Jhonatan Hanna)  Urology  [address] [place] [address]  Phone: [phone]  Fax: [phone]  Follow Up Time: 1 week

## 2022-07-31 NOTE — BRIEF OPERATIVE NOTE - NSICDXBRIEFPOSTOP_GEN_ALL_CORE_FT
POST-OP DIAGNOSIS:  Elevated PSA 31-Jul-2022 21:25:34  Jordi Duran   POST-OP DIAGNOSIS:  Prostate CA 01-Aug-2022 11:39:05  Jordi Duran

## 2022-07-31 NOTE — BRIEF OPERATIVE NOTE - NSICDXBRIEFPROCEDURE_GEN_ALL_CORE_FT
PROCEDURES:  Biopsy of prostate by transperineal approach with integrated magnetic resonance-ultrasound guidance 31-Jul-2022 21:25:19  Jordi Duran

## 2022-07-31 NOTE — BRIEF OPERATIVE NOTE - NSICDXBRIEFPREOP_GEN_ALL_CORE_FT
PRE-OP DIAGNOSIS:  Elevated PSA 31-Jul-2022 21:25:26  Jordi Duran   PRE-OP DIAGNOSIS:  Prostate CA 01-Aug-2022 11:38:55  Jordi Duran

## 2022-07-31 NOTE — BRIEF OPERATIVE NOTE - OPERATION/FINDINGS
You may experience mild pain at the site of the procedure. You can shower, but please keep area dry afterwards and reapply bacitracin and new bandage if needed. You may also experience blood in your urine, stool, and during ejaculation which will clear up in a few days. This is expected. You may take over-the-counter medication such as Tylenol for pain, but do not exceed 4,000 mg of Tylenol daily. 1 target lesion, see full dictation

## 2022-08-01 ENCOUNTER — APPOINTMENT (OUTPATIENT)
Dept: UROLOGY | Facility: AMBULATORY SURGERY CENTER | Age: 72
End: 2022-08-01

## 2022-08-01 ENCOUNTER — RESULT REVIEW (OUTPATIENT)
Age: 72
End: 2022-08-01

## 2022-08-01 ENCOUNTER — OUTPATIENT (OUTPATIENT)
Dept: OUTPATIENT SERVICES | Facility: HOSPITAL | Age: 72
LOS: 1 days | Discharge: HOME | End: 2022-08-01

## 2022-08-01 VITALS
OXYGEN SATURATION: 97 % | RESPIRATION RATE: 15 BRPM | DIASTOLIC BLOOD PRESSURE: 78 MMHG | SYSTOLIC BLOOD PRESSURE: 125 MMHG | TEMPERATURE: 98 F | HEART RATE: 65 BPM

## 2022-08-01 VITALS
OXYGEN SATURATION: 98 % | HEIGHT: 77 IN | WEIGHT: 208.12 LBS | HEART RATE: 57 BPM | TEMPERATURE: 96 F | RESPIRATION RATE: 16 BRPM | SYSTOLIC BLOOD PRESSURE: 138 MMHG | DIASTOLIC BLOOD PRESSURE: 75 MMHG

## 2022-08-01 DIAGNOSIS — Z98.890 OTHER SPECIFIED POSTPROCEDURAL STATES: Chronic | ICD-10-CM

## 2022-08-01 DIAGNOSIS — Z86.018 PERSONAL HISTORY OF OTHER BENIGN NEOPLASM: Chronic | ICD-10-CM

## 2022-08-01 DIAGNOSIS — Z98.49 CATARACT EXTRACTION STATUS, UNSPECIFIED EYE: Chronic | ICD-10-CM

## 2022-08-01 PROCEDURE — 76872 US TRANSRECTAL: CPT | Mod: 26

## 2022-08-01 PROCEDURE — 55706 BX PRST8 NDL SAT SAMPLING: CPT | Mod: 22

## 2022-08-01 PROCEDURE — 88305 TISSUE EXAM BY PATHOLOGIST: CPT | Mod: 26

## 2022-08-01 PROCEDURE — 76377 3D RENDER W/INTRP POSTPROCES: CPT | Mod: 26

## 2022-08-01 RX ORDER — ROSUVASTATIN CALCIUM 5 MG/1
1 TABLET ORAL
Qty: 0 | Refills: 0 | DISCHARGE

## 2022-08-01 RX ORDER — INDAPAMIDE 1.25 MG
1 TABLET ORAL
Qty: 0 | Refills: 0 | DISCHARGE

## 2022-08-01 RX ORDER — OLMESARTAN MEDOXOMIL 5 MG/1
1 TABLET, FILM COATED ORAL
Qty: 0 | Refills: 0 | DISCHARGE

## 2022-08-01 RX ORDER — HYDROMORPHONE HYDROCHLORIDE 2 MG/ML
0.5 INJECTION INTRAMUSCULAR; INTRAVENOUS; SUBCUTANEOUS ONCE
Refills: 0 | Status: DISCONTINUED | OUTPATIENT
Start: 2022-08-01 | End: 2022-08-01

## 2022-08-01 RX ORDER — FAMOTIDINE 10 MG/ML
1 INJECTION INTRAVENOUS
Qty: 0 | Refills: 0 | DISCHARGE

## 2022-08-01 RX ORDER — OMEPRAZOLE 10 MG/1
1 CAPSULE, DELAYED RELEASE ORAL
Qty: 0 | Refills: 0 | DISCHARGE

## 2022-08-01 RX ORDER — ASPIRIN/CALCIUM CARB/MAGNESIUM 324 MG
0 TABLET ORAL
Qty: 0 | Refills: 0 | DISCHARGE

## 2022-08-01 RX ORDER — POTASSIUM CHLORIDE 20 MEQ
1 PACKET (EA) ORAL
Qty: 0 | Refills: 0 | DISCHARGE

## 2022-08-01 RX ORDER — SODIUM CHLORIDE 9 MG/ML
1000 INJECTION, SOLUTION INTRAVENOUS
Refills: 0 | Status: DISCONTINUED | OUTPATIENT
Start: 2022-08-01 | End: 2022-08-01

## 2022-08-02 LAB — SARS-COV-2 N GENE NPH QL NAA+PROBE: NOT DETECTED

## 2022-08-03 LAB — SURGICAL PATHOLOGY STUDY: SIGNIFICANT CHANGE UP

## 2022-08-06 ENCOUNTER — NON-APPOINTMENT (OUTPATIENT)
Age: 72
End: 2022-08-06

## 2023-06-14 PROBLEM — N20.0 CALCULUS OF KIDNEY: Chronic | Status: ACTIVE | Noted: 2022-08-01

## 2023-06-14 PROBLEM — K21.9 GASTRO-ESOPHAGEAL REFLUX DISEASE WITHOUT ESOPHAGITIS: Chronic | Status: ACTIVE | Noted: 2022-08-01

## 2023-06-14 PROBLEM — R35.0 FREQUENCY OF MICTURITION: Chronic | Status: ACTIVE | Noted: 2022-08-01

## 2023-06-14 PROBLEM — I10 ESSENTIAL (PRIMARY) HYPERTENSION: Chronic | Status: ACTIVE | Noted: 2022-08-01

## 2023-06-14 PROBLEM — E78.5 HYPERLIPIDEMIA, UNSPECIFIED: Chronic | Status: ACTIVE | Noted: 2022-08-01

## 2023-06-14 PROBLEM — Z87.898 PERSONAL HISTORY OF OTHER SPECIFIED CONDITIONS: Chronic | Status: ACTIVE | Noted: 2022-08-01

## 2023-06-18 PROBLEM — R97.20 ELEVATED PSA: Status: ACTIVE | Noted: 2020-01-29

## 2023-06-18 PROBLEM — R35.0 FREQUENCY OF URINATION: Status: ACTIVE | Noted: 2020-01-29

## 2023-06-18 PROBLEM — K40.90 INGUINAL HERNIA, RIGHT: Status: ACTIVE | Noted: 2020-01-29

## 2023-06-18 PROBLEM — C61 ADENOCARCINOMA OF PROSTATE: Status: ACTIVE | Noted: 2021-04-13

## 2023-06-18 PROBLEM — R31.29 MICROHEMATURIA: Status: ACTIVE | Noted: 2022-02-08

## 2023-06-18 PROBLEM — N20.9 UROLITHIASIS: Status: ACTIVE | Noted: 2021-03-18

## 2023-06-21 ENCOUNTER — APPOINTMENT (OUTPATIENT)
Dept: UROLOGY | Facility: CLINIC | Age: 73
End: 2023-06-21
Payer: MEDICARE

## 2023-06-21 ENCOUNTER — RESULT CHARGE (OUTPATIENT)
Age: 73
End: 2023-06-21

## 2023-06-21 VITALS
DIASTOLIC BLOOD PRESSURE: 82 MMHG | HEART RATE: 64 BPM | TEMPERATURE: 97.8 F | OXYGEN SATURATION: 96 % | WEIGHT: 210 LBS | BODY MASS INDEX: 24.9 KG/M2 | SYSTOLIC BLOOD PRESSURE: 131 MMHG

## 2023-06-21 DIAGNOSIS — C61 MALIGNANT NEOPLASM OF PROSTATE: ICD-10-CM

## 2023-06-21 DIAGNOSIS — R97.20 ELEVATED PROSTATE, SPECIFIC ANTIGEN [PSA]: ICD-10-CM

## 2023-06-21 DIAGNOSIS — R31.29 OTHER MICROSCOPIC HEMATURIA: ICD-10-CM

## 2023-06-21 DIAGNOSIS — K40.90 UNILATERAL INGUINAL HERNIA, W/OUT OBSTRUCTION OR GANGRENE, NOT SPECIFIED AS RECURRENT: ICD-10-CM

## 2023-06-21 DIAGNOSIS — R35.0 FREQUENCY OF MICTURITION: ICD-10-CM

## 2023-06-21 DIAGNOSIS — N20.9 URINARY CALCULUS, UNSPECIFIED: ICD-10-CM

## 2023-06-21 LAB
BILIRUB UR QL STRIP: NORMAL
CLARITY UR: CLEAR
COLLECTION METHOD: NORMAL
GLUCOSE UR-MCNC: NORMAL
HCG UR QL: 0.2 EU/DL
HGB UR QL STRIP.AUTO: NORMAL
KETONES UR-MCNC: NORMAL
LEUKOCYTE ESTERASE UR QL STRIP: NORMAL
NITRITE UR QL STRIP: NORMAL
PH UR STRIP: 7
PROT UR STRIP-MCNC: NORMAL
SP GR UR STRIP: 1.02

## 2023-06-21 PROCEDURE — 81003 URINALYSIS AUTO W/O SCOPE: CPT | Mod: QW

## 2023-06-21 PROCEDURE — 99214 OFFICE O/P EST MOD 30 MIN: CPT

## 2023-06-21 PROCEDURE — 51798 US URINE CAPACITY MEASURE: CPT

## 2023-06-21 RX ORDER — FAMOTIDINE 10 MG/1
TABLET, FILM COATED ORAL
Refills: 0 | Status: ACTIVE | COMMUNITY

## 2023-06-21 NOTE — PHYSICAL EXAM
[General Appearance - Well Developed] : well developed [General Appearance - Well Nourished] : well nourished [Normal Appearance] : normal appearance [General Appearance - In No Acute Distress] : no acute distress [Well Groomed] : well groomed [Abdomen Soft] : soft [Abdomen Tenderness] : non-tender [Abdomen Mass (___ Cm)] : no abdominal mass palpated [Costovertebral Angle Tenderness] : no ~M costovertebral angle tenderness [Urethral Meatus] : meatus normal [Urinary Bladder Findings] : the bladder was normal on palpation [Penis Abnormality] : normal circumcised penis [Scrotum] : the scrotum was normal [Testes Tenderness] : no tenderness of the testes [Epididymis] : the epididymides were normal [Testes Mass (___cm)] : there were no testicular masses [No Prostate Nodules] : no prostate nodules [Prostate Tenderness] : the prostate was not tender [Skin Color & Pigmentation] : normal skin color and pigmentation [Edema] : no peripheral edema [Respiration, Rhythm And Depth] : normal respiratory rhythm and effort [] : no respiratory distress [Exaggerated Use Of Accessory Muscles For Inspiration] : no accessory muscle use [Affect] : the affect was normal [Oriented To Time, Place, And Person] : oriented to person, place, and time [Mood] : the mood was normal [Not Anxious] : not anxious [Normal Station and Gait] : the gait and station were normal for the patient's age [No Focal Deficits] : no focal deficits [No Palpable Adenopathy] : no palpable adenopathy [FreeTextEntry1] : Diffuse angiomas

## 2023-06-21 NOTE — ASSESSMENT
[FreeTextEntry1] : I discussed the findings and options with . SYED HALL in detail. He will continue with active surveillance (AS) for the low risk prostate cancer. I strongly advise that he proceed with a followup prostate MRI. He experienced claustrophobia with the prior MRIs, and will consider premedication prior to having this done.\par \par We will call Mr. Hall with the PSA and subsequent MRI.  He understands that he should have a PSA and LOBO biannually.  (He will be selling his [facility] apartment and moving to [place].)\par \par \par \par \par

## 2023-06-21 NOTE — HISTORY OF PRESENT ILLNESS
[FreeTextEntry1] : Mr. SYED HALL comes in today for followup. In April 2021, he was diagnosed with a Grade Group 1 prostatic adenocarcinoma, and is currently pursuing active surveillance. He underwent a confirmatory transperineal prostate biopsy in August 2022 which was benign.\par \par From his general urologic history, Mr. Hall presents with a longstanding history of minimal-moderate lower urinary tract symptoms (predominantly irritative) and nocturia x 1-2. On occasion he has rare urge incontinence.  He has decreased his caffeine intake, which has resulted in improvement. \par IPSS: 7/3\par Sono (performed to assess bladder emptying): Nil PVR\par \par Mr. Hall also has a history of urolithiasis since his 20's, with three total episodes of colic--most recently approximately 14yrs ago.  He did not require any intervention for these. \par \par His erectile function is normal although he is not sexually active. He has not noted hematospermia.\par \par PSAs:  2/8/22--18.50; 10/15/21--17.4; 7/2/21--13.0; 2/5/21--17.4; 2/3/21--13.1; 1/29/20--11.6 (23%); 5/7/19--9.6; 1/10/19--11.0 (20%); 6/28/17--13.2 (23%); 12/15/16--11.1 (25%); 6/17/14--11.4 (19%); 3/13/13--7.2; 1/18/10--8.1 (17%)\par \par Prostate bx: 8/1/22--BPH (13 cores); 4/8/21--Grade Group 1 ([place] 3+3=6) prostatic adenocarcinoma in the right posterior apex, involving 25% of single core biopsy; 2/27/09--BPH (13 cores); 4/7/06--BPH (~17 cores taken)\par \par MRI prostate: 2/12/21--Appearance of hemorrhage within the left seminal vesicle is consistent with a PI-RADS 3 lesion (no change from prior study). 141cc prostate; 4/21/16--Severe BPH\par \par Renal sono: 2/4/21--Bilateral renal cysts.  No stones.\par \par \par

## 2023-06-22 LAB — PSA SERPL-MCNC: 14 NG/ML

## 2023-07-12 NOTE — ASU PATIENT PROFILE, ADULT - PRO MENTAL HEALTH SX RECENT
OFFICE VISIT    Patient: Diaz Downs   : 1949 MRN: [id number]    SUBJECTIVE:  Chief Complaint   Patient presents with   • Establish Care     GI issues (gas and bm issues) x 2 years       Patient has given consent to record this visit for documentation in their clinical record.    A 73 year old male presents for an establishment of care.    HISTORY OF PRESENT ILLNESS:    Historian: Self.    AV block, 1st degree: Currently, on Eliquis, started the medication two months ago. He was on Aspirin previously. Is being monitored by Dr. Dickson, cardiologist.      Benign essential hypertension: Currently on Losartan and Metoprolol.    Coronary artery disease involving native coronary artery of native heart without angina pectoris: Currently on Eliquis.   Is being monitored by Jean Claude Dillon NP cardiology.        Pure hypercholesterolemia: Currently on Lipitor. Is being monitored by Jean Claude Dillon NP cardiology. Last lipid panel was done by his cardiologist recently.    Nonischemic cardiomyopathy (CMD): Shortness or breath and chest pain/pressure present. BNP was elevated to 600's pg/mL, and is being monitored by Jean Claude Dillon NP cardiology.    Class 3 severe obesity due to excess calories with serious comorbidity and body mass index (BMI) of 45.0 to 49.9 in adult (CMD):    Peripheral edema: Mentions that he has swelling in his legs. Does not wear compression stalkings, never tried them.    Chronic diarrhea: Mentions that he is having GI issues for two years (never happened before two years). States that when he sits for long periods and gets up he has to pass gas, but cant get the gas pass as most of the time it is liquid. Sometimes it's in the form of stools, but sometimes its liquid. Mentions that it does not happen every single day, but has been happens for six months in a year consistently. Happens around 5-6 times in day, thus has to run to the bathroom. Passes more of liquid stools than solid. No abdominal pain or  cramping in general, but noticed it once, two weeks ago which went away on its own. Generally no blood in the stools or dark stools, but if it happens for a couple of days consistently, then he can notice something like a blood clot. Denies rectal bleeding or noticing blood while wiping. Mentions that he has a proper (empties pretty well) bowel movement. Notices a little mucus in the stools. External hemorrhoids present, which are not painful or swollen, but sometimes notices blood on the toilet paper due to the same. No heartburn or GI problem with swallowing. No changes in the diet or any other trigger factors. No family history of colorectal cancer, colitis or bowel problems. Not on any medication for bowel movements like laxatives, fiber or magnesium supplements. Denies taking any medication like Imodium to stop the diarrhea. Today he had been to the bathroom once (stools). Does not have to run to the bathroom after eating, its random. No fever or chills and constipation. Sometimes his stomach is bloated. No problem like feeling full easily after eating. Does not feel like his bowels are gurgling throughout the day, but feels the same when he sits down (more like a lot of pressure). Not willing for hemorrhoids examination. Denies distension of belly, belching or burping. Underwent CT of abdomen and pelvis, which showed kidney stones, within the past two years and has an upcoming appointment. Inquires if he has to do fasting blood work as he just had a cup of coffee this morning. Denies rectal exam today, states that his urologist usually does that exam.  Additional comments  Family history: His sister had blood cancer, and she was given treatment for the same. Brother has diabetes.    Current medication: Currently on Flomax and Allopurinol. Not taking Oxybutynin.    Denies rashes on skin.    Vitals are stable.    Not in any acute distress.       PAST MEDICAL HISTORY:  Past Medical History:   Diagnosis Date   •  Cardiomyopathy (CMD)    • Essential (primary) hypertension    • Hyperlipoproteinemia      MEDICATIONS:  Current Outpatient Medications   Medication Sig Dispense Refill   • tamsulosin (FLOMAX) 0.4 MG Cap TAKE 1 CAPSULE BY MOUTH EVERY DAY 30 MINUTES AFTER EVENING MEAL 90 capsule 1   • losartan (COZAAR) 50 MG tablet TAKE 1 TABLET BY MOUTH EVERY DAY 90 tablet 3   • atorvastatin (LIPITOR) 40 MG tablet TAKE 1 TABLET BY MOUTH EVERY DAY 90 tablet 3   • apixaBAN (Eliquis) 5 MG Tab Take 1 tablet by mouth every 12 hours. 60 tablet 11   • metoPROLOL succinate (TOPROL-XL) 50 MG 24 hr tablet TAKE 1 TABLET BY MOUTH EVERY DAY 90 tablet 3   • allopurinol (ZYLOPRIM) 100 MG tablet Take 1 tablet by mouth daily. 30 tablet 11   • potassium citrate ER 10 MEQ (1080 MG) Tab CR TAKE 1 TABLET BY MOUTH EVERY DAY WITH BREAKFAST 30 tablet 11     No current facility-administered medications for this visit.     ALLERGIES:  Allergies as of 06/26/2023   • (No Known Allergies)     FAMILY HISTORY:  Family History   Problem Relation Age of Onset   • Stroke/TIA Mother    • Stroke Mother    • Patient is unaware of any medical problems Father    • Cancer Sister         unsure - blood cancer   • Patient is unaware of any medical problems Sister    • Patient is unaware of any medical problems Sister    • Patient is unaware of any medical problems Brother    • Diabetes Brother    • Patient is unaware of any medical problems Daughter    • Patient is unaware of any medical problems Son      SOCIAL HISTORY:  Social History     Tobacco Use   • Smoking status: Former     Current packs/day: 0.00     Types: Cigars   • Smokeless tobacco: Never   • Tobacco comments:     quit 42 years ago after his son was born   Substance Use Topics   • Alcohol use: Yes     Alcohol/week: 7.0 standard drinks of alcohol     Types: 7 Standard drinks or equivalent per week     Comment: One good stiff drink daily.   • Drug use: No     Past Surgical HISTORY  Past Surgical History:    Procedure Laterality Date   • Cataract extraction w/  intraocular lens implant Left 07/23/2018    Dr. Reinoso    • Cataract extraction w/ intraocular lens implant Right 07/16/2018    Dr. Reinoso       REVIEW OF SYSTEMS:    CVS: As per HPI.  Resp: As per HPI.  GI: As per HPI.  : As per HPI.  Cons: As per HPI.  Skin: As per HPI.  ROS was obtained as per above and HPI and all other systems reviewed otherwise negative      OBJECTIVE:  Visit Vitals  /64 (BP Location: RUE - Right upper extremity, Patient Position: Sitting, Cuff Size: Large Adult)   Pulse (!) 103   Resp 18   Ht 5' 11.4\"   Wt (!) 158 kg (348 lb 5.2 oz)   SpO2 98%   BMI 48.04 kg/m²       PHYSICAL EXAM:    Constitutional: Alert, in no acute distress and current vital signs reviewed. Obese.  Head and Face: Atraumatic and normocephalic.   Eyes: No discharge, no eyelid swelling and the sclerae were normal.   ENT: Oropharynx normal. Normal appearing outer ear. Tympanic membranes are bilaterally clear, normal appearing nose and normal lips.   Neck: Normal appearing neck and supple neck.   Pulmonary: Breath sounds clear to auscultation bilaterally, but no respiratory distress and normal respiratory rate and effort.   Cardiovascular: Normal rate, regular rhythm, normal S1, normal S2.  Bilateral Lower Extremities Venous stasis changes with shiny skin and non pitting edema to bilateral lower extremities.  Abdomen: Soft and nontender. Obese, which makes it difficult to access for hepatomegaly and splenomegaly. No umbilical hernia or masses palpated. Hyperactive bowel sounds throughout.  Psychiatric: Alert and awake, interactive and mood/affect were appropriate.   Skin, Hair, Nails: Normal skin color and pigmentation.      DIAGNOSTIC STUDIES:  LAB RESULTS:  Lab Services on 06/26/2023   Component Date Value Ref Range Status   • C-Reactive Protein 06/26/2023 0.9  <=1.0 mg/dL Final   • Fasting Status 06/26/2023 12  0 - 999 Hours Final   • Sodium 06/26/2023 140  135 -  145 mmol/L Final   • Potassium 06/26/2023 5.0  3.4 - 5.1 mmol/L Final   • Chloride 06/26/2023 102  97 - 110 mmol/L Final   • Carbon Dioxide 06/26/2023 29  21 - 32 mmol/L Final   • Anion Gap 06/26/2023 14  7 - 19 mmol/L Final   • Glucose 06/26/2023 101 (H)  70 - 99 mg/dL Final   • BUN 06/26/2023 17  6 - 20 mg/dL Final   • Creatinine 06/26/2023 1.01  0.67 - 1.17 mg/dL Final   • Glomerular Filtration Rate 06/26/2023 78  >=60 Final   • BUN/Cr 06/26/2023 17  7 - 25 Final   • Calcium 06/26/2023 9.2  8.4 - 10.2 mg/dL Final   • Bilirubin, Total 06/26/2023 1.0  0.2 - 1.0 mg/dL Final   • GOT/AST 06/26/2023 26  <=37 Units/L Final   • GPT/ALT 06/26/2023 31  <64 Units/L Final   • Alkaline Phosphatase 06/26/2023 86  45 - 117 Units/L Final   • Albumin 06/26/2023 3.6  3.6 - 5.1 g/dL Final   • Protein, Total 06/26/2023 7.6  6.4 - 8.2 g/dL Final   • Globulin 06/26/2023 4.0  2.0 - 4.0 g/dL Final   • A/G Ratio 06/26/2023 0.9 (L)  1.0 - 2.4 Final   • WBC 06/26/2023 6.2  4.2 - 11.0 K/mcL Final   • RBC 06/26/2023 4.28 (L)  4.50 - 5.90 mil/mcL Final   • HGB 06/26/2023 13.7  13.0 - 17.0 g/dL Final   • HCT 06/26/2023 41.4  39.0 - 51.0 % Final   • MCV 06/26/2023 96.7  78.0 - 100.0 fl Final   • MCH 06/26/2023 32.0  26.0 - 34.0 pg Final   • MCHC 06/26/2023 33.1  32.0 - 36.5 g/dL Final   • RDW-CV 06/26/2023 14.0  11.0 - 15.0 % Final   • RDW-SD 06/26/2023 49.9  39.0 - 50.0 fL Final   • PLT 06/26/2023 197  140 - 450 K/mcL Final   • Neutrophil, Percent 06/26/2023 56  % Final   • Lymphocytes, Percent 06/26/2023 29  % Final   • Mono, Percent 06/26/2023 10  % Final   • Eosinophils, Percent 06/26/2023 5  % Final   • Basophils, Percent 06/26/2023 0  % Final   • Immature Granulocytes 06/26/2023 0  % Final   • Absolute Neutrophils 06/26/2023 3.5  1.8 - 7.7 K/mcL Final   • Absolute Lymphocytes 06/26/2023 1.8  1.0 - 4.0 K/mcL Final   • Absolute Monocytes 06/26/2023 0.6  0.3 - 0.9 K/mcL Final   • Absolute Eosinophils  06/26/2023 0.3  0.0 - 0.5 K/mcL Final    • Absolute Basophils 06/26/2023 0.0  0.0 - 0.3 K/mcL Final   • Absolute Immature Granulocytes 06/26/2023 0.0  0.0 - 0.2 K/mcL Final       ASSESSMENT AND PLAN:  This 73 year old male presents with :  1. AV block, 1st degree    2. Benign essential hypertension    3. Coronary artery disease involving native coronary artery of native heart without angina pectoris    4. Pure hypercholesterolemia    5. Nonischemic cardiomyopathy (CMD)    6. Class 3 severe obesity due to excess calories with serious comorbidity and body mass index (BMI) of 45.0 to 49.9 in adult (CMD)    7. Peripheral edema    8. Chronic diarrhea        Orders Placed This Encounter   • Occult Blood - iFOB (aka FIT)   • Sodium   • C Reactive Protein   • Comprehensive Metabolic Panel   • CBC with Automated Differential   • SERVICE TO GASTROENTEROLOGY       PLAN:    AV block, 1st degree:  Recommended continuing current management.    Benign essential hypertension:  Recommended continuing current management.      Coronary artery disease involving native coronary artery of native heart without angina pectoris:  Recommended continuing current management.      Pure hypercholesterolemia:  Recommended continuing current management.      Nonischemic cardiomyopathy (CMD):  Stable.  Recommended continuing current management.      Class 3 severe obesity due to excess calories with serious comorbidity and body mass index (BMI) of 45.0 to 49.9 in adult (CMD):  Obese.  Recommended continuing current management.      Peripheral edema:  Recommended continuing current management.      Chronic diarrhea:  Reviewed and discussed previous labs.  Ordered Occult Blood - iFOB (aka FIT); Future.  Ordered Sodium; Future.  Ordered C Reactive Protein; Future.  Ordered Comprehensive Metabolic Panel; Future.  Ordered CBC with Automated Differential; Future.  Explained that he will get a call regarding the results of the lab.  Explained that likely to have underlining inflammatory  condition that is causing mucus in the stool.  Explained that X-ray may not be necessary as there is no abdominal pain, thus less likely to have obstruction.  Explained that he will have to see GI specialist for further evaluation.  Explained the labs that may be performed by the GI specialist.  Discussed fasting labs that have to be done, in detail.  Discussed inflammatory markers.  Referral SERVICE TO GASTROENTEROLOGY.    Additional plan:  Current medication:     Recommended continuing current management.    Discussed establishing with a provender of his choice, in detail.        Follow up with lab results (clinic PRN) or as needed.      Recent PHQ 2/9 Score    PHQ 2:  Date Adult PHQ 2 Score Adult PHQ 2 Interpretation   6/26/2023 0 No further screening needed       PHQ 9:         Recent Review Flowsheet Data     Date 6/26/2023    Adult PHQ 2 Score 0    Adult PHQ 2 Interpretation No further screening needed    Little interest or pleasure in activity? Not at all    Feeling down, depressed or hopeless? Not at all          DEPRESSION ASSESSMENT/PLAN:  Depression screening is negative no further plan needed.    Body mass index is 48.04 kg/m².    Refer to orders.  Medical compliance with plan discussed and risks of non-compliance reviewed.  Patient education completed on disease process, etiology & prognosis.  Proper usage and side effects of medications reviewed & discussed.  Return to clinic as clinically indicated as discussed with the patient who verbalized understanding of the plan and is in agreement with the plan.    I,  Dr. John Rm, have created a visit summary document based on the audio recording between Dr. Arielle Pollack NP and this patient for the physician to review, edit as needed, and authenticate.    Creation Date: 6/27/2023     I have reviewed and edited the visit summary above and attest that it is accurate.    Arielle Pollack NP  Internal Medicine  [address] [place] [address]  [id number]  Phone [phone]  Fax [phone]       none

## 2023-12-04 NOTE — END OF VISIT
SIX MINUTE WALK DISTANCE  BASELINE VITALS  HR:  82  BP:  108/55  SPO2:  89% on room air    END OF STUDY VITALS:  HR:  100  BP:  118/81  SPO2:  91% on 3 L nasal cannula    Patient walked 854 ft with 0 rests.   SpO2 ion was 83% at 2 minutes.   SpO2 recovered to> than sign 89% with 3 L nasal cannula.  The test was repeated on 3 L nasal cannula and the patient ambulated 704 ft with SpO2 remaining >94%.  Patient was tachycardic with ambulation that resolved with rest.    Recommend patient to be on 1 L nasal cannula at rest and 3 L nasal cannula with ambulation.      Milagros Munguia MD  Pulmonary and Critical Care  [facility]        [Time Spent: ___ minutes] : I have spent [unfilled] minutes of time on the encounter.

## (undated) DEVICE — SYR CATH TIP 2 OZ

## (undated) DEVICE — PLASTIC SOLUTION BOWL 160Z

## (undated) DEVICE — NDL MAX CORE 18G X 25CM

## (undated) DEVICE — BALLOON ENDOCAVITY 2X14CM

## (undated) DEVICE — NDL HYPO REGULAR BEVEL 25G X 1.5" (BLUE)

## (undated) DEVICE — DRSG TELFA 3 X 8

## (undated) DEVICE — SYR LUER LOK 50CC

## (undated) DEVICE — DRAPE TOWEL BLUE 17" X 24"

## (undated) DEVICE — SYR LUER LOK 10CC

## (undated) DEVICE — VENODYNE/SCD SLEEVE CALF MEDIUM

## (undated) DEVICE — NDL BIOPSY CHIBA 22G X 20CM

## (undated) DEVICE — WARMING BLANKET UPPER ADULT

## (undated) DEVICE — GRID BRACHYTHERAPY EZ 18G

## (undated) DEVICE — GLV 7.5 PROTEXIS (WHITE)

## (undated) DEVICE — PREP BETADINE SPONGE STICKS

## (undated) DEVICE — DRAPE MEDIUM SHEET 44" X 70"